# Patient Record
Sex: MALE | Race: WHITE | Employment: FULL TIME | URBAN - METROPOLITAN AREA
[De-identification: names, ages, dates, MRNs, and addresses within clinical notes are randomized per-mention and may not be internally consistent; named-entity substitution may affect disease eponyms.]

---

## 2018-03-05 PROBLEM — I51.7 LVH (LEFT VENTRICULAR HYPERTROPHY): Status: ACTIVE | Noted: 2018-03-05

## 2018-04-24 PROBLEM — I42.0 DILATED CARDIOMYOPATHY (HCC): Status: ACTIVE | Noted: 2018-04-24

## 2018-04-24 PROBLEM — I71.2 THORACIC AORTIC ANEURYSM WITHOUT RUPTURE (HCC): Status: ACTIVE | Noted: 2018-04-24

## 2018-04-24 PROBLEM — I71.20 THORACIC AORTIC ANEURYSM WITHOUT RUPTURE: Status: ACTIVE | Noted: 2018-04-24

## 2018-05-02 ENCOUNTER — APPOINTMENT (OUTPATIENT)
Dept: LAB | Facility: HOSPITAL | Age: 33
End: 2018-05-02
Attending: INTERNAL MEDICINE
Payer: COMMERCIAL

## 2018-05-02 DIAGNOSIS — Q23.1 BICUSPID AORTIC VALVE: ICD-10-CM

## 2018-05-02 DIAGNOSIS — I42.0 DILATED CARDIOMYOPATHY (HCC): ICD-10-CM

## 2018-05-02 DIAGNOSIS — I71.2 THORACIC AORTIC ANEURYSM WITHOUT RUPTURE (HCC): ICD-10-CM

## 2018-05-02 PROCEDURE — 80048 BASIC METABOLIC PNL TOTAL CA: CPT

## 2018-05-02 PROCEDURE — 36415 COLL VENOUS BLD VENIPUNCTURE: CPT

## 2018-05-04 ENCOUNTER — HOSPITAL ENCOUNTER (OUTPATIENT)
Dept: CT IMAGING | Facility: HOSPITAL | Age: 33
Discharge: HOME | End: 2018-05-04
Attending: INTERNAL MEDICINE
Payer: COMMERCIAL

## 2018-05-04 DIAGNOSIS — I42.0 DILATED CARDIOMYOPATHY (HCC): ICD-10-CM

## 2018-05-04 DIAGNOSIS — I71.2 THORACIC AORTIC ANEURYSM WITHOUT RUPTURE (HCC): ICD-10-CM

## 2018-05-04 DIAGNOSIS — Q23.1 BICUSPID AORTIC VALVE: ICD-10-CM

## 2018-05-04 PROCEDURE — 71275 CT ANGIOGRAPHY CHEST: CPT | Performed by: INTERNAL MEDICINE

## 2018-12-14 ENCOUNTER — HOSPITAL ENCOUNTER (OUTPATIENT)
Dept: CV DIAGNOSTICS | Facility: HOSPITAL | Age: 33
Discharge: HOME | End: 2018-12-14
Attending: INTERNAL MEDICINE
Payer: COMMERCIAL

## 2018-12-14 ENCOUNTER — HOSPITAL ENCOUNTER (OUTPATIENT)
Dept: LAB | Facility: HOSPITAL | Age: 33
Discharge: HOME | End: 2018-12-14
Attending: FAMILY MEDICINE
Payer: COMMERCIAL

## 2018-12-14 DIAGNOSIS — Q23.1 BICUSPID AORTIC VALVE: ICD-10-CM

## 2018-12-14 DIAGNOSIS — I42.0 DILATED CARDIOMYOPATHY (HCC): ICD-10-CM

## 2018-12-14 DIAGNOSIS — Z13.228 SCREENING FOR ENDOCRINE, NUTRITIONAL, METABOLIC AND IMMUNITY DISORDER: ICD-10-CM

## 2018-12-14 DIAGNOSIS — Z13.0 SCREENING FOR ENDOCRINE, NUTRITIONAL, METABOLIC AND IMMUNITY DISORDER: ICD-10-CM

## 2018-12-14 DIAGNOSIS — Z13.29 SCREENING FOR ENDOCRINE, NUTRITIONAL, METABOLIC AND IMMUNITY DISORDER: ICD-10-CM

## 2018-12-14 DIAGNOSIS — I51.7 LVH (LEFT VENTRICULAR HYPERTROPHY): ICD-10-CM

## 2018-12-14 DIAGNOSIS — Z13.21 SCREENING FOR ENDOCRINE, NUTRITIONAL, METABOLIC AND IMMUNITY DISORDER: ICD-10-CM

## 2018-12-14 PROCEDURE — 93306 TTE W/DOPPLER COMPLETE: CPT | Performed by: INTERNAL MEDICINE

## 2018-12-14 PROCEDURE — 84403 ASSAY OF TOTAL TESTOSTERONE: CPT

## 2018-12-14 PROCEDURE — 36415 COLL VENOUS BLD VENIPUNCTURE: CPT

## 2018-12-14 PROCEDURE — 84402 ASSAY OF FREE TESTOSTERONE: CPT

## 2018-12-20 NOTE — PROGRESS NOTES
Results and provider instructions reviewed with patient. Patient requested results be released to his [employer] account for his records.

## 2019-01-14 PROCEDURE — 84403 ASSAY OF TOTAL TESTOSTERONE: CPT | Performed by: FAMILY MEDICINE

## 2019-01-14 PROCEDURE — 84402 ASSAY OF FREE TESTOSTERONE: CPT | Performed by: FAMILY MEDICINE

## 2019-02-05 PROBLEM — I50.22 CHRONIC SYSTOLIC HEART FAILURE (HCC): Status: ACTIVE | Noted: 2019-02-05

## 2019-08-07 PROCEDURE — 84402 ASSAY OF FREE TESTOSTERONE: CPT | Performed by: FAMILY MEDICINE

## 2019-08-07 PROCEDURE — 84403 ASSAY OF TOTAL TESTOSTERONE: CPT | Performed by: FAMILY MEDICINE

## 2021-07-30 PROBLEM — R79.89 LOW TESTOSTERONE IN MALE: Status: ACTIVE | Noted: 2021-07-30

## 2021-07-30 PROBLEM — D58.2 ELEVATED HEMOGLOBIN: Status: ACTIVE | Noted: 2021-07-30

## 2021-07-30 PROBLEM — D58.2 ELEVATED HEMOGLOBIN (HCC): Status: ACTIVE | Noted: 2021-07-30

## 2025-03-25 ENCOUNTER — HOSPITAL ENCOUNTER (EMERGENCY)
Facility: HOSPITAL | Age: 40
Discharge: HOME | End: 2025-03-25
Attending: STUDENT IN AN ORGANIZED HEALTH CARE EDUCATION/TRAINING PROGRAM
Payer: COMMERCIAL

## 2025-03-25 ENCOUNTER — APPOINTMENT (OUTPATIENT)
Dept: GENERAL RADIOLOGY | Facility: HOSPITAL | Age: 40
End: 2025-03-25
Attending: STUDENT IN AN ORGANIZED HEALTH CARE EDUCATION/TRAINING PROGRAM
Payer: COMMERCIAL

## 2025-03-25 ENCOUNTER — APPOINTMENT (OUTPATIENT)
Dept: CT IMAGING | Facility: HOSPITAL | Age: 40
End: 2025-03-25
Attending: STUDENT IN AN ORGANIZED HEALTH CARE EDUCATION/TRAINING PROGRAM
Payer: COMMERCIAL

## 2025-03-25 VITALS
WEIGHT: 305 LBS | HEART RATE: 71 BPM | SYSTOLIC BLOOD PRESSURE: 118 MMHG | OXYGEN SATURATION: 100 % | RESPIRATION RATE: 23 BRPM | TEMPERATURE: 99 F | DIASTOLIC BLOOD PRESSURE: 61 MMHG | HEIGHT: 70 IN | BODY MASS INDEX: 43.67 KG/M2

## 2025-03-25 DIAGNOSIS — R07.89 CHEST PAIN, ATYPICAL: Primary | ICD-10-CM

## 2025-03-25 DIAGNOSIS — I77.810 DILATED AORTIC ROOT: ICD-10-CM

## 2025-03-25 LAB
ALBUMIN SERPL-MCNC: 4.5 G/DL (ref 3.2–4.8)
ALBUMIN/GLOB SERPL: 1.8 {RATIO} (ref 1–2)
ALP LIVER SERPL-CCNC: 57 U/L
ALT SERPL-CCNC: 50 U/L
ANION GAP SERPL CALC-SCNC: 9 MMOL/L (ref 0–18)
AST SERPL-CCNC: 28 U/L (ref ?–34)
ATRIAL RATE: 84 BPM
BASOPHILS # BLD AUTO: 0.09 X10(3) UL (ref 0–0.2)
BASOPHILS NFR BLD AUTO: 0.9 %
BILIRUB SERPL-MCNC: 0.6 MG/DL (ref 0.3–1.2)
BUN BLD-MCNC: 11 MG/DL (ref 9–23)
CALCIUM BLD-MCNC: 9.8 MG/DL (ref 8.7–10.6)
CHLORIDE SERPL-SCNC: 103 MMOL/L (ref 98–112)
CO2 SERPL-SCNC: 26 MMOL/L (ref 21–32)
CREAT BLD-MCNC: 0.96 MG/DL
EGFRCR SERPLBLD CKD-EPI 2021: 104 ML/MIN/1.73M2 (ref 60–?)
EOSINOPHIL # BLD AUTO: 0.2 X10(3) UL (ref 0–0.7)
EOSINOPHIL NFR BLD AUTO: 2.1 %
ERYTHROCYTE [DISTWIDTH] IN BLOOD BY AUTOMATED COUNT: 12.8 %
GLOBULIN PLAS-MCNC: 2.5 G/DL (ref 2–3.5)
GLUCOSE BLD-MCNC: 135 MG/DL (ref 70–99)
HCT VFR BLD AUTO: 50.3 %
HGB BLD-MCNC: 17.5 G/DL
IMM GRANULOCYTES # BLD AUTO: 0.3 X10(3) UL (ref 0–1)
IMM GRANULOCYTES NFR BLD: 3.2 %
LYMPHOCYTES # BLD AUTO: 2.65 X10(3) UL (ref 1–4)
LYMPHOCYTES NFR BLD AUTO: 27.8 %
MCH RBC QN AUTO: 30.4 PG (ref 26–34)
MCHC RBC AUTO-ENTMCNC: 34.8 G/DL (ref 31–37)
MCV RBC AUTO: 87.5 FL
MONOCYTES # BLD AUTO: 0.94 X10(3) UL (ref 0.1–1)
MONOCYTES NFR BLD AUTO: 9.9 %
NEUTROPHILS # BLD AUTO: 5.34 X10 (3) UL (ref 1.5–7.7)
NEUTROPHILS # BLD AUTO: 5.34 X10(3) UL (ref 1.5–7.7)
NEUTROPHILS NFR BLD AUTO: 56.1 %
OSMOLALITY SERPL CALC.SUM OF ELEC: 287 MOSM/KG (ref 275–295)
P AXIS: 60 DEGREES
P-R INTERVAL: 194 MS
PLATELET # BLD AUTO: 198 10(3)UL (ref 150–450)
POTASSIUM SERPL-SCNC: 3.8 MMOL/L (ref 3.5–5.1)
PROT SERPL-MCNC: 7 G/DL (ref 5.7–8.2)
Q-T INTERVAL: 340 MS
QRS DURATION: 100 MS
QTC CALCULATION (BEZET): 401 MS
R AXIS: 13 DEGREES
RBC # BLD AUTO: 5.75 X10(6)UL
SODIUM SERPL-SCNC: 138 MMOL/L (ref 136–145)
T AXIS: -10 DEGREES
TROPONIN I SERPL HS-MCNC: 3 NG/L
TROPONIN I SERPL HS-MCNC: 3 NG/L
VENTRICULAR RATE: 84 BPM
WBC # BLD AUTO: 9.5 X10(3) UL (ref 4–11)

## 2025-03-25 PROCEDURE — 85025 COMPLETE CBC W/AUTO DIFF WBC: CPT | Performed by: STUDENT IN AN ORGANIZED HEALTH CARE EDUCATION/TRAINING PROGRAM

## 2025-03-25 PROCEDURE — 84484 ASSAY OF TROPONIN QUANT: CPT | Performed by: STUDENT IN AN ORGANIZED HEALTH CARE EDUCATION/TRAINING PROGRAM

## 2025-03-25 PROCEDURE — 71260 CT THORAX DX C+: CPT | Performed by: STUDENT IN AN ORGANIZED HEALTH CARE EDUCATION/TRAINING PROGRAM

## 2025-03-25 PROCEDURE — 93010 ELECTROCARDIOGRAM REPORT: CPT

## 2025-03-25 PROCEDURE — 93005 ELECTROCARDIOGRAM TRACING: CPT

## 2025-03-25 PROCEDURE — 80053 COMPREHEN METABOLIC PANEL: CPT | Performed by: STUDENT IN AN ORGANIZED HEALTH CARE EDUCATION/TRAINING PROGRAM

## 2025-03-25 PROCEDURE — 71045 X-RAY EXAM CHEST 1 VIEW: CPT | Performed by: STUDENT IN AN ORGANIZED HEALTH CARE EDUCATION/TRAINING PROGRAM

## 2025-03-25 PROCEDURE — 99285 EMERGENCY DEPT VISIT HI MDM: CPT

## 2025-03-25 PROCEDURE — 36415 COLL VENOUS BLD VENIPUNCTURE: CPT

## 2025-03-25 RX ORDER — ASPIRIN 81 MG/1
81 TABLET, CHEWABLE ORAL NIGHTLY
COMMUNITY

## 2025-03-25 NOTE — ED QUICK NOTES
Patient states right sided chest pain, better with palpation of his right pectoral muscle, has been intermittent X a few days. Described as a burning sensation, that correlates with breathing. 1/10. Hx of cardiomyopathy.

## 2025-03-25 NOTE — ED INITIAL ASSESSMENT (HPI)
Patient has a history of cardiomyopathy. Tightness in right side of chest X a few days. Relief when he presses on his right pectoral muscle.

## 2025-03-25 NOTE — ED PROVIDER NOTES
Patient Seen in: [facility] Emergency Department      History     Chief Complaint   Patient presents with    Chest Pain Angina     Stated Complaint: chest pain    Subjective:   HPI      Patient is a 39-year-old male history of dilated cardiomyopathy, bicuspid AV aortic valve presented to the emergency room with reports of chest pain over the last several days has been intermittent.  He states it is right-sided and not exertional.  He does state it feels like a tightness over the right side of his chest.  He has also had some associated shortness of breath with it.  He denies any lightheadedness dizziness or syncopal episodes.  He follows with duly cardiology.    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  ([facility] ([facility] care everywhere)    Stroke (HCC)    left eye - reduced vision.    Tinea versicolor              History reviewed. No pertinent surgical history.             Social History     Socioeconomic History    Marital status:    Tobacco Use    Smoking status: Former     Current packs/day: 1.00     Average packs/day: 1 pack/day for 10.0 years (10.0 ttl pk-yrs)     Types: Cigarettes    Smokeless tobacco: Never   Vaping Use    Vaping status: Never Used   Substance and Sexual Activity    Alcohol use: No     Comment: < 1 per month    Drug use: No                  Physical Exam     ED Triage Vitals [03/25/25 1328]   /74   Pulse 82   Resp 20   Temp 98.9 °F (37.2 °C)   Temp src Oral   SpO2 95 %   O2 Device None (Room air)       Current Vitals:   Vital Signs  BP: 118/61  Pulse: 71  Resp: 23  Temp: 98.9 °F (37.2 °C)  Temp src: Oral  MAP  (mmHg): 77    Oxygen Therapy  SpO2: 100 %  O2 Device: None (Room air)        Physical Exam  Vitals and nursing note reviewed.   Constitutional:       General: He is not in acute distress.     Appearance: Normal appearance.   HENT:      Head: Normocephalic.      Nose: Nose normal.      Mouth/Throat:      Mouth: Mucous membranes are moist.   Eyes:      Extraocular Movements: Extraocular movements intact.      Pupils: Pupils are equal, round, and reactive to light.   Cardiovascular:      Rate and Rhythm: Normal rate and regular rhythm.      Pulses: Normal pulses.      Heart sounds: Murmur heard.      Systolic murmur is present.   Pulmonary:      Effort: Pulmonary effort is normal.   Chest:      Chest wall: No edema.   Abdominal:      General: Abdomen is flat. Bowel sounds are normal. There is no distension.      Palpations: Abdomen is soft.      Tenderness: There is no abdominal tenderness. There is no right CVA tenderness, left CVA tenderness, guarding or rebound.      Hernia: No hernia is present.   Musculoskeletal:         General: No swelling or tenderness. Normal range of motion.      Cervical back: Normal range of motion.   Skin:     General: Skin is warm and dry.   Neurological:      Mental Status: He is alert and oriented to person, place, and time. Mental status is at baseline.   Psychiatric:         Mood and Affect: Mood normal.             ED Course     Labs Reviewed   CBC WITH DIFFERENTIAL WITH PLATELET - Abnormal; Notable for the following components:       Result Value    RBC 5.75 (*)     All other components within normal limits   COMP METABOLIC PANEL (14) - Abnormal; Notable for the following components:    Glucose 135 (*)     ALT 50 (*)     All other components within normal limits   TROPONIN I HIGH SENSITIVITY - Normal   TROPONIN I HIGH SENSITIVITY - Normal   RAINBOW DRAW LAVENDER   RAINBOW DRAW LIGHT GREEN   RAINBOW DRAW BLUE     EKG    Rate, intervals and axes as noted on EKG Report.  Rate:  84  Rhythm: Sinus Rhythm  Reading: No ST elevations or depressions, signs of LVH present                CT CHEST PE AORTA (IV ONLY) (CPT=71260)    Result Date: 3/25/2025  CONCLUSION:  1. There is enlargement of the aortic root which measures maximum diameter from sinus to sinus of 4.5 cm just above the aortic valve.  The ascending aorta is otherwise normal in diameter measuring 3.8 cm at the level of the main pulmonary artery. 2. Severe calcifications of aortic valve which most likely indicate a bicuspid and possibly stenotic valve. 3. There is no evidence of dissection or aneurysm rupture. 4. Diffuse decreased attenuation in liver parenchyma is compatible with hepatic steatosis.  Round area of relative increased attenuation or increased enhancement in segment 7 of liver has benign imaging appearance with differential considerations provided above.    LOCATION:  [facility]   Dictated by (CST): Lenny Nunez MD on 3/25/2025 at 4:39 PM     Finalized by (CST): Lenny Nunez MD on 3/25/2025 at 4:44 PM       XR CHEST AP/PA (1 VIEW) (CPT=71045)    Result Date: 3/25/2025  CONCLUSION:  No active disease seen.   LOCATION:  [id number]      Dictated by (CST): Andrews Vang MD on 3/25/2025 at 1:48 PM     Finalized by (CST): Andrews Vang MD on 3/25/2025 at 1:48 PM             MDM              Medical Decision Making  The differential includes the following  Acute MI which is a life threat, ACS,, PE, aortic aneurysm rupture, costochondritis    Pertinent comorbidities include  As detailed above    Pertinent social history includes  As detailed above    Labs  Initial troponin negative    Imaging studies  I reviewed the images and my independent interpreation after review is no evidence of pulmonary edema. Additionaly, I reviewd the radiology report that states the following no acute cardiopulmonary process    External data reviewed  Echocardiogram from June 2024 which shows normal EF, moderate aortic stenosis    Discussion of  management with external providers    ER course  Patient's afebrile hemodynamically stable here.  Currently he has no active chest pain.  Patient does have significant cardiac history.  EKG not acutely ischemic initial troponin is negative.  Given his reports of pleuritic nature of his pain along with his known aortic aneurysm we will also obtain CTA to rule out further intrathoracic pathology.    3:48 PM  Patient's second high-sensitivity troponin is negative.  He is still awaiting CT of the chest but will discuss further with cardiology.  Anticipate discharge with close follow-up if CT is negative.    3:53 PM  Case discussed with Dr. Anastasia Lozano of [facility] cardiology who agrees with continuing on with CT and if negative patient will have arranged follow-up with his cardiologist sooner than June.    4:49 PM  CT CHEST PE AORTA (IV ONLY) (CPT=71260)    Result Date: 3/25/2025  CONCLUSION:  1. There is enlargement of the aortic root which measures maximum diameter from sinus to sinus of 4.5 cm just above the aortic valve.  The ascending aorta is otherwise normal in diameter measuring 3.8 cm at the level of the main pulmonary artery. 2. Severe calcifications of aortic valve which most likely indicate a bicuspid and possibly stenotic valve. 3. There is no evidence of dissection or aneurysm rupture. 4. Diffuse decreased attenuation in liver parenchyma is compatible with hepatic steatosis.  Round area of relative increased attenuation or increased enhancement in segment 7 of liver has benign imaging appearance with differential considerations provided above.    LOCATION:  [facility]   Dictated by (CST): Lenny Nunez MD on 3/25/2025 at 4:39 PM     Finalized by (CST): Lenny Nunez MD on 3/25/2025 at 4:44 PM       XR CHEST AP/PA (1 VIEW) (CPT=71045)    Result Date: 3/25/2025  CONCLUSION:  No active disease seen.   LOCATION:  [id number]      Dictated by (CST): Andrews Vang MD on 3/25/2025 at 1:48 PM     Finalized by (CST):  Andrews Vang MD on 3/25/2025 at 1:48 PM        4:53 PM  Results discussed with Dr. Tal Hinton via [employer] who feels comfortable with patient being discharged as he is asymptomatic.  He will get follow-up within the next few weeks.  He has been given strict return precautions.    Disposition and Plan     Clinical Impression:  1. Chest pain, atypical    2. Dilated aortic root         Disposition:  Discharge  3/25/2025  4:54 pm    Follow-up:  Amador Peacock MD  [address] [place] [id number]  [phone]    Follow up            Medications Prescribed:  Current Discharge Medication List              Supplementary Documentation: